# Patient Record
Sex: FEMALE
[De-identification: names, ages, dates, MRNs, and addresses within clinical notes are randomized per-mention and may not be internally consistent; named-entity substitution may affect disease eponyms.]

---

## 2024-05-08 ENCOUNTER — APPOINTMENT (OUTPATIENT)
Dept: ORTHOPEDIC SURGERY | Facility: CLINIC | Age: 59
End: 2024-05-08
Payer: COMMERCIAL

## 2024-05-08 DIAGNOSIS — M54.9 DORSALGIA, UNSPECIFIED: ICD-10-CM

## 2024-05-08 PROCEDURE — 72110 X-RAY EXAM L-2 SPINE 4/>VWS: CPT

## 2024-05-08 PROCEDURE — 99214 OFFICE O/P EST MOD 30 MIN: CPT

## 2024-05-08 RX ORDER — DICLOFENAC SODIUM 75 MG/1
75 TABLET, DELAYED RELEASE ORAL
Qty: 40 | Refills: 0 | Status: ACTIVE | COMMUNITY
Start: 2024-05-08 | End: 1900-01-01

## 2024-05-08 NOTE — PHYSICAL EXAM
[de-identified] : Lumbar Physical Exam  Antalgic gait with a cane Reflexes Patellar - normal Gastroc - normal Clonus - No  Hip Exam - Normal  Straight leg raise - none  Pulses - 2+ dp/pt  Range of motion - normal  Sensation  Sensation intact to light touch in L1, L2, L3, L4, L5 and S1 dermatomes bilaterally  Motor 	IP	Quad	HS	TA	Gastroc	EHL Right	5/5	5/5	5/5	5/5	5/5	5/5 Left	3+/5	5/5	5/5	5/5	5/5	5/5 [de-identified] : Lumbar radiographs Facet arthropathy noted Disc degeneration noted

## 2024-05-08 NOTE — HISTORY OF PRESENT ILLNESS
[de-identified] : This is a 58-year-old female here today for evaluation treatment of her low back and leg symptoms.  She has been dealing with the symptoms for several weeks.  She described pain which does not go from her back down her left leg.  She denies any bowel bladder issues.  She denies any saddle anesthesia.  She is here today to discuss neck steps in her care.

## 2024-06-12 ENCOUNTER — APPOINTMENT (OUTPATIENT)
Dept: ORTHOPEDIC SURGERY | Facility: CLINIC | Age: 59
End: 2024-06-12